# Patient Record
Sex: FEMALE | Race: BLACK OR AFRICAN AMERICAN | NOT HISPANIC OR LATINO | Employment: STUDENT | URBAN - METROPOLITAN AREA
[De-identification: names, ages, dates, MRNs, and addresses within clinical notes are randomized per-mention and may not be internally consistent; named-entity substitution may affect disease eponyms.]

---
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
## 2021-03-31 DIAGNOSIS — Z23 ENCOUNTER FOR IMMUNIZATION: ICD-10-CM

## 2023-11-22 ENCOUNTER — HOSPITAL ENCOUNTER (EMERGENCY)
Facility: HOSPITAL | Age: 23
Discharge: HOME | End: 2023-11-23
Attending: EMERGENCY MEDICINE
Payer: COMMERCIAL

## 2023-11-22 DIAGNOSIS — R51.9 HEADACHE: Primary | ICD-10-CM

## 2023-11-22 DIAGNOSIS — E87.6 HYPOKALEMIA: ICD-10-CM

## 2023-11-22 PROBLEM — Z98.890 S/P RHINOPLASTY: Status: ACTIVE | Noted: 2023-11-22

## 2023-11-22 LAB
ALBUMIN SERPL BCP-MCNC: 4.1 G/DL (ref 3.5–5)
ALP SERPL-CCNC: 60 U/L (ref 34–104)
ALT SERPL W P-5'-P-CCNC: 12 U/L (ref 7–52)
ANION GAP SERPL CALCULATED.3IONS-SCNC: 7 MMOL/L
AST SERPL W P-5'-P-CCNC: 17 U/L (ref 13–39)
BASOPHILS # BLD AUTO: 0.02 THOUSANDS/ÂΜL (ref 0–0.1)
BASOPHILS NFR BLD AUTO: 0 % (ref 0–1)
BILIRUB SERPL-MCNC: 0.17 MG/DL (ref 0.2–1)
BUN SERPL-MCNC: 10 MG/DL (ref 5–25)
CALCIUM SERPL-MCNC: 8.4 MG/DL (ref 8.4–10.2)
CHLORIDE SERPL-SCNC: 107 MMOL/L (ref 96–108)
CO2 SERPL-SCNC: 23 MMOL/L (ref 21–32)
CREAT SERPL-MCNC: 0.71 MG/DL (ref 0.6–1.3)
EOSINOPHIL # BLD AUTO: 0.22 THOUSAND/ÂΜL (ref 0–0.61)
EOSINOPHIL NFR BLD AUTO: 3 % (ref 0–6)
ERYTHROCYTE [DISTWIDTH] IN BLOOD BY AUTOMATED COUNT: 16 % (ref 11.6–15.1)
EXT PREGNANCY TEST URINE: NEGATIVE
EXT. CONTROL: NORMAL
FLUAV RNA RESP QL NAA+PROBE: NEGATIVE
FLUBV RNA RESP QL NAA+PROBE: NEGATIVE
GFR SERPL CREATININE-BSD FRML MDRD: 121 ML/MIN/1.73SQ M
GLUCOSE SERPL-MCNC: 81 MG/DL (ref 65–140)
HCT VFR BLD AUTO: 39.2 % (ref 34.8–46.1)
HGB BLD-MCNC: 12 G/DL (ref 11.5–15.4)
IMM GRANULOCYTES # BLD AUTO: 0.02 THOUSAND/UL (ref 0–0.2)
IMM GRANULOCYTES NFR BLD AUTO: 0 % (ref 0–2)
LIPASE SERPL-CCNC: 32 U/L (ref 11–82)
LYMPHOCYTES # BLD AUTO: 1.26 THOUSANDS/ÂΜL (ref 0.6–4.47)
LYMPHOCYTES NFR BLD AUTO: 18 % (ref 14–44)
MCH RBC QN AUTO: 22.4 PG (ref 26.8–34.3)
MCHC RBC AUTO-ENTMCNC: 30.6 G/DL (ref 31.4–37.4)
MCV RBC AUTO: 73 FL (ref 82–98)
MONOCYTES # BLD AUTO: 0.93 THOUSAND/ÂΜL (ref 0.17–1.22)
MONOCYTES NFR BLD AUTO: 13 % (ref 4–12)
NEUTROPHILS # BLD AUTO: 4.48 THOUSANDS/ÂΜL (ref 1.85–7.62)
NEUTS SEG NFR BLD AUTO: 66 % (ref 43–75)
NRBC BLD AUTO-RTO: 0 /100 WBCS
PLATELET # BLD AUTO: 253 THOUSANDS/UL (ref 149–390)
PMV BLD AUTO: 10.7 FL (ref 8.9–12.7)
POTASSIUM SERPL-SCNC: 3.3 MMOL/L (ref 3.5–5.3)
PROT SERPL-MCNC: 6.8 G/DL (ref 6.4–8.4)
RBC # BLD AUTO: 5.36 MILLION/UL (ref 3.81–5.12)
RSV RNA RESP QL NAA+PROBE: NEGATIVE
SARS-COV-2 RNA RESP QL NAA+PROBE: NEGATIVE
SODIUM SERPL-SCNC: 137 MMOL/L (ref 135–147)
WBC # BLD AUTO: 6.93 THOUSAND/UL (ref 4.31–10.16)

## 2023-11-22 PROCEDURE — 36415 COLL VENOUS BLD VENIPUNCTURE: CPT

## 2023-11-22 PROCEDURE — 96374 THER/PROPH/DIAG INJ IV PUSH: CPT

## 2023-11-22 PROCEDURE — 93005 ELECTROCARDIOGRAM TRACING: CPT

## 2023-11-22 PROCEDURE — 99285 EMERGENCY DEPT VISIT HI MDM: CPT | Performed by: EMERGENCY MEDICINE

## 2023-11-22 PROCEDURE — 0241U HB NFCT DS VIR RESP RNA 4 TRGT: CPT

## 2023-11-22 PROCEDURE — 80053 COMPREHEN METABOLIC PANEL: CPT

## 2023-11-22 PROCEDURE — 96375 TX/PRO/DX INJ NEW DRUG ADDON: CPT

## 2023-11-22 PROCEDURE — 83690 ASSAY OF LIPASE: CPT

## 2023-11-22 PROCEDURE — 85025 COMPLETE CBC W/AUTO DIFF WBC: CPT

## 2023-11-22 PROCEDURE — 81025 URINE PREGNANCY TEST: CPT

## 2023-11-22 PROCEDURE — 99284 EMERGENCY DEPT VISIT MOD MDM: CPT

## 2023-11-22 PROCEDURE — 96361 HYDRATE IV INFUSION ADD-ON: CPT

## 2023-11-22 RX ORDER — ONDANSETRON 2 MG/ML
4 INJECTION INTRAMUSCULAR; INTRAVENOUS ONCE
Status: COMPLETED | OUTPATIENT
Start: 2023-11-22 | End: 2023-11-22

## 2023-11-22 RX ORDER — KETOROLAC TROMETHAMINE 30 MG/ML
15 INJECTION, SOLUTION INTRAMUSCULAR; INTRAVENOUS ONCE
Status: COMPLETED | OUTPATIENT
Start: 2023-11-22 | End: 2023-11-22

## 2023-11-22 RX ORDER — ACETAMINOPHEN 325 MG/1
975 TABLET ORAL ONCE
Status: COMPLETED | OUTPATIENT
Start: 2023-11-22 | End: 2023-11-22

## 2023-11-22 RX ADMIN — SODIUM CHLORIDE 1000 ML: 0.9 INJECTION, SOLUTION INTRAVENOUS at 22:50

## 2023-11-22 RX ADMIN — ONDANSETRON 4 MG: 2 INJECTION INTRAMUSCULAR; INTRAVENOUS at 22:47

## 2023-11-22 RX ADMIN — ACETAMINOPHEN 975 MG: 325 TABLET, FILM COATED ORAL at 22:46

## 2023-11-22 RX ADMIN — KETOROLAC TROMETHAMINE 15 MG: 30 INJECTION, SOLUTION INTRAMUSCULAR at 22:47

## 2023-11-23 ENCOUNTER — APPOINTMENT (EMERGENCY)
Dept: CT IMAGING | Facility: HOSPITAL | Age: 23
End: 2023-11-23
Payer: COMMERCIAL

## 2023-11-23 VITALS
TEMPERATURE: 98.6 F | DIASTOLIC BLOOD PRESSURE: 56 MMHG | RESPIRATION RATE: 17 BRPM | SYSTOLIC BLOOD PRESSURE: 114 MMHG | HEART RATE: 94 BPM | OXYGEN SATURATION: 100 %

## 2023-11-23 VITALS
OXYGEN SATURATION: 100 % | DIASTOLIC BLOOD PRESSURE: 68 MMHG | SYSTOLIC BLOOD PRESSURE: 115 MMHG | RESPIRATION RATE: 18 BRPM | TEMPERATURE: 98.5 F | HEART RATE: 86 BPM

## 2023-11-23 DIAGNOSIS — R10.84 GENERALIZED ABDOMINAL PAIN: Primary | ICD-10-CM

## 2023-11-23 LAB
ALBUMIN SERPL BCP-MCNC: 3.9 G/DL (ref 3.5–5)
ALP SERPL-CCNC: 63 U/L (ref 34–104)
ALT SERPL W P-5'-P-CCNC: 16 U/L (ref 7–52)
ANION GAP SERPL CALCULATED.3IONS-SCNC: 7 MMOL/L
AST SERPL W P-5'-P-CCNC: 23 U/L (ref 13–39)
ATRIAL RATE: 82 BPM
BASOPHILS # BLD AUTO: 0.02 THOUSANDS/ÂΜL (ref 0–0.1)
BASOPHILS NFR BLD AUTO: 0 % (ref 0–1)
BILIRUB SERPL-MCNC: 0.19 MG/DL (ref 0.2–1)
BILIRUB UR QL STRIP: NEGATIVE
BUN SERPL-MCNC: 8 MG/DL (ref 5–25)
CALCIUM SERPL-MCNC: 8.8 MG/DL (ref 8.4–10.2)
CARDIAC TROPONIN I PNL SERPL HS: <2 NG/L
CHLORIDE SERPL-SCNC: 107 MMOL/L (ref 96–108)
CLARITY UR: CLEAR
CO2 SERPL-SCNC: 22 MMOL/L (ref 21–32)
COLOR UR: COLORLESS
CREAT SERPL-MCNC: 0.74 MG/DL (ref 0.6–1.3)
EOSINOPHIL # BLD AUTO: 0.11 THOUSAND/ÂΜL (ref 0–0.61)
EOSINOPHIL NFR BLD AUTO: 2 % (ref 0–6)
ERYTHROCYTE [DISTWIDTH] IN BLOOD BY AUTOMATED COUNT: 15.9 % (ref 11.6–15.1)
GFR SERPL CREATININE-BSD FRML MDRD: 115 ML/MIN/1.73SQ M
GLUCOSE SERPL-MCNC: 96 MG/DL (ref 65–140)
GLUCOSE UR STRIP-MCNC: NEGATIVE MG/DL
HCT VFR BLD AUTO: 40.2 % (ref 34.8–46.1)
HGB BLD-MCNC: 12.3 G/DL (ref 11.5–15.4)
HGB UR QL STRIP.AUTO: NEGATIVE
IMM GRANULOCYTES # BLD AUTO: 0.01 THOUSAND/UL (ref 0–0.2)
IMM GRANULOCYTES NFR BLD AUTO: 0 % (ref 0–2)
KETONES UR STRIP-MCNC: NEGATIVE MG/DL
LEUKOCYTE ESTERASE UR QL STRIP: NEGATIVE
LIPASE SERPL-CCNC: 26 U/L (ref 11–82)
LYMPHOCYTES # BLD AUTO: 0.68 THOUSANDS/ÂΜL (ref 0.6–4.47)
LYMPHOCYTES NFR BLD AUTO: 11 % (ref 14–44)
MCH RBC QN AUTO: 22.3 PG (ref 26.8–34.3)
MCHC RBC AUTO-ENTMCNC: 30.6 G/DL (ref 31.4–37.4)
MCV RBC AUTO: 73 FL (ref 82–98)
MONOCYTES # BLD AUTO: 0.72 THOUSAND/ÂΜL (ref 0.17–1.22)
MONOCYTES NFR BLD AUTO: 11 % (ref 4–12)
NEUTROPHILS # BLD AUTO: 4.86 THOUSANDS/ÂΜL (ref 1.85–7.62)
NEUTS SEG NFR BLD AUTO: 76 % (ref 43–75)
NITRITE UR QL STRIP: NEGATIVE
NRBC BLD AUTO-RTO: 0 /100 WBCS
P AXIS: 60 DEGREES
PH UR STRIP.AUTO: 5.5 [PH]
PLATELET # BLD AUTO: 220 THOUSANDS/UL (ref 149–390)
PMV BLD AUTO: 10 FL (ref 8.9–12.7)
POTASSIUM SERPL-SCNC: 3.4 MMOL/L (ref 3.5–5.3)
PR INTERVAL: 148 MS
PROT SERPL-MCNC: 6.4 G/DL (ref 6.4–8.4)
PROT UR STRIP-MCNC: NEGATIVE MG/DL
QRS AXIS: 86 DEGREES
QRSD INTERVAL: 76 MS
QT INTERVAL: 382 MS
QTC INTERVAL: 446 MS
RBC # BLD AUTO: 5.52 MILLION/UL (ref 3.81–5.12)
SODIUM SERPL-SCNC: 136 MMOL/L (ref 135–147)
SP GR UR STRIP.AUTO: 1.04 (ref 1–1.03)
T WAVE AXIS: 58 DEGREES
UROBILINOGEN UR STRIP-ACNC: <2 MG/DL
VENTRICULAR RATE: 82 BPM
WBC # BLD AUTO: 6.4 THOUSAND/UL (ref 4.31–10.16)

## 2023-11-23 PROCEDURE — 99284 EMERGENCY DEPT VISIT MOD MDM: CPT

## 2023-11-23 PROCEDURE — 83690 ASSAY OF LIPASE: CPT | Performed by: EMERGENCY MEDICINE

## 2023-11-23 PROCEDURE — 81003 URINALYSIS AUTO W/O SCOPE: CPT

## 2023-11-23 PROCEDURE — 85025 COMPLETE CBC W/AUTO DIFF WBC: CPT | Performed by: EMERGENCY MEDICINE

## 2023-11-23 PROCEDURE — 96374 THER/PROPH/DIAG INJ IV PUSH: CPT

## 2023-11-23 PROCEDURE — 93005 ELECTROCARDIOGRAM TRACING: CPT

## 2023-11-23 PROCEDURE — 99285 EMERGENCY DEPT VISIT HI MDM: CPT | Performed by: EMERGENCY MEDICINE

## 2023-11-23 PROCEDURE — 74177 CT ABD & PELVIS W/CONTRAST: CPT

## 2023-11-23 PROCEDURE — 80053 COMPREHEN METABOLIC PANEL: CPT | Performed by: EMERGENCY MEDICINE

## 2023-11-23 PROCEDURE — 36415 COLL VENOUS BLD VENIPUNCTURE: CPT | Performed by: EMERGENCY MEDICINE

## 2023-11-23 PROCEDURE — G1004 CDSM NDSC: HCPCS

## 2023-11-23 PROCEDURE — 84484 ASSAY OF TROPONIN QUANT: CPT

## 2023-11-23 PROCEDURE — 93010 ELECTROCARDIOGRAM REPORT: CPT | Performed by: INTERNAL MEDICINE

## 2023-11-23 RX ORDER — KETOROLAC TROMETHAMINE 30 MG/ML
15 INJECTION, SOLUTION INTRAMUSCULAR; INTRAVENOUS ONCE
Status: COMPLETED | OUTPATIENT
Start: 2023-11-23 | End: 2023-11-23

## 2023-11-23 RX ORDER — POTASSIUM CHLORIDE 20 MEQ/1
20 TABLET, EXTENDED RELEASE ORAL ONCE
Status: COMPLETED | OUTPATIENT
Start: 2023-11-23 | End: 2023-11-23

## 2023-11-23 RX ADMIN — POTASSIUM CHLORIDE 20 MEQ: 1500 TABLET, EXTENDED RELEASE ORAL at 00:28

## 2023-11-23 RX ADMIN — KETOROLAC TROMETHAMINE 15 MG: 30 INJECTION, SOLUTION INTRAMUSCULAR; INTRAVENOUS at 18:06

## 2023-11-23 RX ADMIN — IOHEXOL 100 ML: 350 INJECTION, SOLUTION INTRAVENOUS at 18:47

## 2023-11-23 NOTE — ED ATTENDING ATTESTATION
11/23/2023  IRay MD, saw and evaluated the patient. I have discussed the patient with the resident/non-physician practitioner and agree with the resident's/non-physician practitioner's findings, Plan of Care, and MDM as documented in the resident's/non-physician practitioner's note, except where noted. All available labs and Radiology studies were reviewed. I was present for key portions of any procedure(s) performed by the resident/non-physician practitioner and I was immediately available to provide assistance. At this point I agree with the current assessment done in the Emergency Department. I have conducted an independent evaluation of this patient a history and physical is as follows:    Cahn Fernandez y/o female presenting for re-evaluation of multiple symptoms that have been present for 2 days. The patient was evaluated in the ED yesterday for the same symptoms. Labs and upreg were unremarkable. She states that all of her symptoms are improved after taking cold and flu medication but that she decided to come in for re-evaluation because "I want to know what's going on."    Patient reports:    - body aches: diffuse. No rashes. No joint pain or swelling  - Nausea: no vomiting. Tolerating oral intake  - abdominal pain: diffuse "bubbly" pain that migrates around the abdomen. No CVA pain. No vaginal discharge, abnormal bleeding, or vaginal pain. No diarrhea, blood in her stool, or black tarry stools. History of prior appendectomy. - chest pain: sharp, stabbing to the right anterior chest. Momentary and non-radiating. No shortness of breath or cough  - headache: gradual in onset without vision changes. Resolved after taking cold and flu medications. - dizziness: lightheadedness with standing  - urinary frequency: no dysuria or hematuria. Physical Exam  Constitutional:       General: She is not in acute distress. Appearance: She is well-developed. She is not diaphoretic.    HENT:      Head: Normocephalic and atraumatic. Right Ear: External ear normal.      Left Ear: External ear normal.      Nose: Nose normal.   Eyes:      Conjunctiva/sclera: Conjunctivae normal.   Cardiovascular:      Rate and Rhythm: Normal rate and regular rhythm. Pulses: Normal pulses. Heart sounds: Normal heart sounds. No murmur heard. No friction rub. No gallop. Pulmonary:      Effort: Pulmonary effort is normal. No respiratory distress. Breath sounds: Normal breath sounds. No wheezing or rales. Abdominal:      General: Bowel sounds are normal. There is no distension. Palpations: Abdomen is soft. Tenderness: There is no abdominal tenderness (benign to deep palpation). There is no right CVA tenderness, left CVA tenderness or guarding. Musculoskeletal:         General: No deformity. Normal range of motion. Cervical back: Normal range of motion and neck supple. Comments: No calf swelling or tenderness   Skin:     General: Skin is warm and dry. Neurological:      Mental Status: She is alert and oriented to person, place, and time. Motor: No abnormal muscle tone. Psychiatric:         Mood and Affect: Mood normal.             ED Course  ED Course as of 11/23/23 1831   Thu Nov 23, 2023   6012 I personally interpreted the pt's EKG which reveals normal rate, NSR, normal axis, normal intervals, T wave flattening in leads V2-V3 without ST segment deviation, pathologic T wave inversions or pathologic Q waves. 35 20 43 Patient is well-appearing, non-toxic, afebrile and hemodynamically stable. Abdomen is completely benign to deep palpation. My concern for acute intra-abdominal surgical pathology is very low but the patient expresses concern that when she had appendicitis she was told that nothing was wrong but ended up needing surgery. She is requesting a CT scan of the abdomen pelvis.  Risks vs. Benefits of CT scan of the abdomen pelvis with low pretest probability discussed with the patient. Based on her concern as part of shared decision making a CT scan of the abdomen pelvis was ordered for further evaluation. 26 Pt denies any CP currently. Reports 2 episodes of momentary sharp chest pain. She is low risk by well criteria and PERC negative- doubt PE. Sating 100% on RA, normal breath sounds heard throughout all lung fields, not consistent with pneumothorax. History and presentation not consistent with aortic dissection or CAD.    1822 Headache was gradual in onset, has completely resolved. No red flag symptoms to suggest acute intracranial process. 1823 COVID/flu/RSV testing yesterday was negative. Saar Guardian yesterday negative. 1825 No leukocytosis. Hgb normal.    1828 Care of pt transferred to Dr. Nieves Guevara while awaiting results of labs and CT scan of the abdomen pelvis.           Critical Care Time  Procedures

## 2023-11-23 NOTE — ED CARE HANDOFF
Emergency Department Sign Out Note        Sign out and transfer of care from Dr. Manan Clinton. See Separate Emergency Department note. The patient, Shae Hugo, was evaluated by the previous provider for generalized abdominal pain. Workup Completed:  Lipase was within normal limits, CMP indicated mild hypokalemia but otherwise grossly normal, CBC was also grossly unremarkable. ED Course / Workup Pending (followup):  CT of the abdomen pelvis is pending at this time. If unremarkable the patient will be discharged for outpatient follow-up with her PCP.                 PERC Rule for PE      Flowsheet Row Most Recent Value   PERC Rule for PE    Age >=50 0 Filed at: 11/23/2023 1831   HR >=100 0 Filed at: 11/23/2023 1831   O2 Sat on room air < 95% 0 Filed at: 11/23/2023 1831   History of PE or DVT 0 Filed at: 11/23/2023 1831   Recent trauma or surgery 0 Filed at: 11/23/2023 1831   Hemoptysis 0 Filed at: 11/23/2023 1831   Exogenous estrogen 0 Filed at: 11/23/2023 1831   Unilateral leg swelling 0 Filed at: 11/23/2023 1831   PERC Rule for PE Results 0 Filed at: 11/23/2023 1831                Wells' Criteria for PE      Flowsheet Row Most Recent Value   Wells' Criteria for PE    Clinical signs and symptoms of DVT 0 Filed at: 11/23/2023 1831   PE is primary diagnosis or equally likely 0 Filed at: 11/23/2023 1831   HR >100 0 Filed at: 11/23/2023 1831   Immobilization at least 3 days or Surgery in the previous 4 weeks 0 Filed at: 11/23/2023 1831   Previous, objectively diagnosed PE or DVT 0 Filed at: 11/23/2023 1831   Hemoptysis 0 Filed at: 11/23/2023 1831   Malignancy with treatment within 6 months or palliative 0 Filed at: 11/23/2023 1831   Wells' Criteria Total 0 Filed at: 11/23/2023 1831                  ED Course as of 11/23/23 2026   Thu Nov 23, 2023 2016 CT abdomen pelvis with contrast  IMPRESSION:     Multiple fluid-filled loops of proximal small bowel in the left upper abdomen with mild mural thickening, likely reflecting a mild enteritis. Otherwise no acute inflammatory process in the abdomen or pelvis. 2022 CT imaging indicates enteritis. The patient will be discharged for outpatient follow-up with her PCP. Return precautions will be discussed. Further instructions per discharge orders. Procedures  Medical Decision Making  The patient is a 20-year-old female with a past medical history of appendicitis status post appendectomy who presents to the emergency department with a complaint of generalized abdominal pain as well as myalgias, headache and fever. The patient reports her fever was 101 °F at home and took an aspirin that led to resolution. The patient's lab workup was relatively unremarkable but the patient is very concerned that there is something going on and demanded a CT scan for which she was granted. CT imaging is pending at this time. Results will determine disposition. Patient has no complaints currently. Amount and/or Complexity of Data Reviewed  Labs: ordered. Radiology: ordered. Decision-making details documented in ED Course. Risk  Prescription drug management. Disposition  Final diagnoses:   Generalized abdominal pain     Time reflects when diagnosis was documented in both MDM as applicable and the Disposition within this note       Time User Action Codes Description Comment    11/23/2023  6:33 PM Ami Rising Add [R10.9] Abdominal pain     11/23/2023  8:20 PM Inocencio Ellison Add [R10.84] Generalized abdominal pain     11/23/2023  8:21 PM Inocencio Ellison Modify [R10.84] Generalized abdominal pain     11/23/2023  8:21 PM Inocencio Ellison Remove [R10.9] Abdominal pain           ED Disposition       ED Disposition   Discharge    Condition   Stable    Date/Time   Thu Nov 23, 2023 2022    Comment   Shae Attohokine discharge to home/self care.                    Follow-up Information       Follow up With Specialties Details Why Contact Info Additional [id number] Jania Shaffer MD Pediatrics Schedule an appointment as soon as possible for a visit  For continued symptoms [address] Emergency Department Emergency Medicine  As needed [address] [facility], [place], [place], [address]          Patient's Medications    No medications on file     No discharge procedures on file.        ED Provider  Electronically Signed by     Arianna Garcia DO  11/23/23 2026

## 2023-11-23 NOTE — ED PROVIDER NOTES
History  Chief Complaint   Patient presents with    Abdominal Pain     X couple days, increased today, Here yesterday for the same, nausea     62-year-old female with no significant past medical history, past surgical history remarkable for appendectomy, presents today for evaluation of sharp generalized abdominal pain. Patient states pain started a few days ago and has been occurring intermittently throughout the day. She reports sometimes she will have the same sharp pains occurring in her chest as well. Reports nausea and more frequent stools but denies any vomiting or diarrhea. Patient notes a fever today up to 101 at home, for which she took an aspirin. Reports associated headaches, myalgias and fatigue. She also reports associated urinary frequency but denies any dysuria or hematuria. Denies any shortness of breath. Denies any abnormal vaginal discharge or vaginal bleeding. LMP occurred about 3 weeks ago. Denies any known sick contacts. Denies any other concerns at this time. Abdominal Pain  Associated symptoms: chest pain, fatigue, fever and nausea    Associated symptoms: no chills, no cough, no diarrhea, no dysuria, no hematuria, no shortness of breath, no sore throat and no vomiting        None       Past Medical History:   Diagnosis Date    S/P rhinoplasty        Past Surgical History:   Procedure Laterality Date    APPENDECTOMY         Family History   Problem Relation Age of Onset    Diabetes Mother     Diabetes Father      I have reviewed and agree with the history as documented. E-Cigarette/Vaping     E-Cigarette/Vaping Substances     Social History     Tobacco Use    Smoking status: Never    Smokeless tobacco: Never   Substance Use Topics    Alcohol use: No    Drug use: No        Review of Systems   Constitutional:  Positive for appetite change (decreased), fatigue and fever. Negative for chills. HENT:  Negative for ear pain and sore throat.     Eyes:  Negative for pain and visual disturbance. Respiratory:  Negative for cough and shortness of breath. Cardiovascular:  Positive for chest pain. Negative for palpitations. Gastrointestinal:  Positive for abdominal pain and nausea. Negative for diarrhea and vomiting. Genitourinary:  Positive for frequency. Negative for dysuria and hematuria. Musculoskeletal:  Positive for myalgias. Negative for arthralgias and back pain. Skin:  Negative for color change and rash. Neurological:  Positive for headaches. Negative for seizures and syncope. All other systems reviewed and are negative. Physical Exam  ED Triage Vitals   Temperature Pulse Respirations Blood Pressure SpO2   11/23/23 1747 11/23/23 1747 11/23/23 1747 11/23/23 1747 11/23/23 1747   98.5 °F (36.9 °C) 86 18 115/68 100 %      Temp src Heart Rate Source Patient Position - Orthostatic VS BP Location FiO2 (%)   -- -- 11/23/23 1747 11/23/23 1747 --     Sitting Right arm       Pain Score       11/23/23 1806       No Pain             Orthostatic Vital Signs  Vitals:    11/23/23 1747   BP: 115/68   Pulse: 86   Patient Position - Orthostatic VS: Sitting       Physical Exam  Vitals and nursing note reviewed. Constitutional:       General: She is not in acute distress. Appearance: She is well-developed. She is not ill-appearing. HENT:      Head: Normocephalic and atraumatic. Mouth/Throat:      Mouth: Mucous membranes are moist.   Eyes:      Conjunctiva/sclera: Conjunctivae normal.   Cardiovascular:      Rate and Rhythm: Normal rate and regular rhythm. Heart sounds: No murmur heard. Pulmonary:      Effort: Pulmonary effort is normal. No respiratory distress. Breath sounds: Normal breath sounds. Abdominal:      General: Abdomen is flat. There is no distension. Palpations: Abdomen is soft. Tenderness: There is no abdominal tenderness. There is no right CVA tenderness, left CVA tenderness, guarding or rebound.    Musculoskeletal:         General: No swelling. Cervical back: Neck supple. Skin:     General: Skin is warm and dry. Capillary Refill: Capillary refill takes less than 2 seconds. Neurological:      Mental Status: She is alert.          ED Medications  Medications   ketorolac (TORADOL) injection 15 mg (15 mg Intravenous Given 11/23/23 1806)       Diagnostic Studies  Results Reviewed       Procedure Component Value Units Date/Time    Comprehensive metabolic panel [[id number]]  (Abnormal) Collected: 11/23/23 1804    Lab Status: Final result Specimen: Blood from Arm, Left Updated: 11/23/23 1830     Sodium 136 mmol/L      Potassium 3.4 mmol/L      Chloride 107 mmol/L      CO2 22 mmol/L      ANION GAP 7 mmol/L      BUN 8 mg/dL      Creatinine 0.74 mg/dL      Glucose 96 mg/dL      Calcium 8.8 mg/dL      AST 23 U/L      ALT 16 U/L      Alkaline Phosphatase 63 U/L      Total Protein 6.4 g/dL      Albumin 3.9 g/dL      Total Bilirubin 0.19 mg/dL      eGFR 115 ml/min/1.73sq m     Narrative:      [place] guidelines for Chronic Kidney Disease (CKD):     Stage 1 with normal or high GFR (GFR > 90 mL/min/1.73 square meters)    Stage 2 Mild CKD (GFR = 60-89 mL/min/1.73 square meters)    Stage 3A Moderate CKD (GFR = 45-59 mL/min/1.73 square meters)    Stage 3B Moderate CKD (GFR = 30-44 mL/min/1.73 square meters)    Stage 4 Severe CKD (GFR = 15-29 mL/min/1.73 square meters)    Stage 5 End Stage CKD (GFR <15 mL/min/1.73 square meters)  Note: GFR calculation is accurate only with a steady state creatinine    Lipase [[id number]]  (Normal) Collected: 11/23/23 1804    Lab Status: Final result Specimen: Blood from Arm, Left Updated: 11/23/23 1830     Lipase 26 u/L     CBC and differential [[id number]]  (Abnormal) Collected: 11/23/23 1804    Lab Status: Final result Specimen: Blood from Arm, Left Updated: 11/23/23 1823     WBC 6.40 Thousand/uL      RBC 5.52 Million/uL      Hemoglobin 12.3 g/dL      Hematocrit 40.2 %      MCV 73 fL      MCH 22.3 pg      MCHC 30.6 g/dL      RDW 15.9 %      MPV 10.0 fL      Platelets 586 Thousands/uL      nRBC 0 /100 WBCs      Neutrophils Relative 76 %      Immat GRANS % 0 %      Lymphocytes Relative 11 %      Monocytes Relative 11 %      Eosinophils Relative 2 %      Basophils Relative 0 %      Neutrophils Absolute 4.86 Thousands/µL      Immature Grans Absolute 0.01 Thousand/uL      Lymphocytes Absolute 0.68 Thousands/µL      Monocytes Absolute 0.72 Thousand/µL      Eosinophils Absolute 0.11 Thousand/µL      Basophils Absolute 0.02 Thousands/µL     HS Troponin 0hr (reflex protocol) [[id number]] Collected: 11/23/23 1804    Lab Status:  In process Specimen: Blood from Arm, Left Updated: 11/23/23 1807    UA w Reflex to Microscopic w Reflex to Culture [[id number]]     Lab Status: No result Specimen: Urine                    CT abdomen pelvis with contrast    (Results Pending)         Procedures  ECG 12 Lead Documentation Only    Date/Time: 11/23/2023 6:31 PM    Performed by: Brain Martínez MD  Authorized by: Brain Martínez MD    Indications / Diagnosis:  Chest pain  ECG reviewed by me, the ED Provider: yes    Patient location:  ED  Previous ECG:     Previous ECG:  Compared to current    Similarity:  No change  Interpretation:     Interpretation: normal    Rate:     ECG rate:  85    ECG rate assessment: normal    Rhythm:     Rhythm: sinus rhythm    Ectopy:     Ectopy: none    QRS:     QRS axis:  Normal    QRS intervals:  Normal  Conduction:     Conduction: normal    ST segments:     ST segments:  Normal  T waves:     T waves: normal    Comments:      Normal sinus rhythm, normal axis, normal intervals, no ST T wave abnormalities         ED Course                     PERC Rule for PE      Flowsheet Row Most Recent Value   PERC Rule for PE    Age >=50 0 Filed at: 11/23/2023 1831   HR >=100 0 Filed at: 11/23/2023 1831   O2 Sat on room air < 95% 0 Filed at: 11/23/2023 1831   History of PE or DVT 0 Filed at: 11/23/2023 1831 Recent trauma or surgery 0 Filed at: 11/23/2023 1831   Hemoptysis 0 Filed at: 11/23/2023 1831   Exogenous estrogen 0 Filed at: 11/23/2023 1831   Unilateral leg swelling 0 Filed at: 11/23/2023 1831   PERC Rule for PE Results 0 Filed at: 11/23/2023 1831                SBIRT 22yo+      Flowsheet Row Most Recent Value   Initial Alcohol Screen: US AUDIT-C     1. How often do you have a drink containing alcohol? 0 Filed at: 11/23/2023 1747   2. How many drinks containing alcohol do you have on a typical day you are drinking? 0 Filed at: 11/23/2023 1747   3a. Male UNDER 65: How often do you have five or more drinks on one occasion? 0 Filed at: 11/23/2023 1747   3b. FEMALE Any Age, or MALE 65+: How often do you have 4 or more drinks on one occassion? 0 Filed at: 11/23/2023 1747   Audit-C Score 0 Filed at: 11/23/2023 1747   VINAYAK: How many times in the past year have you. .. Used an illegal drug or used a prescription medication for non-medical reasons? Never Filed at: 11/23/2023 1747            Wells' Criteria for PE      Flowsheet Row Most Recent Value   Wells' Criteria for PE    Clinical signs and symptoms of DVT 0 Filed at: 11/23/2023 1831   PE is primary diagnosis or equally likely 0 Filed at: 11/23/2023 1831   HR >100 0 Filed at: 11/23/2023 1831   Immobilization at least 3 days or Surgery in the previous 4 weeks 0 Filed at: 11/23/2023 1831   Previous, objectively diagnosed PE or DVT 0 Filed at: 11/23/2023 1831   Hemoptysis 0 Filed at: 11/23/2023 1831   Malignancy with treatment within 6 months or palliative 0 Filed at: 11/23/2023 1831   Wells' Criteria Total 0 Filed at: 11/23/2023 [address]  25-year-old female with no significant past medical history, past surgical history remarkable for appendectomy, presents today for evaluation of sharp generalized abdominal pain with associated nausea, myalgias, headaches, chest pain, and urinary frequency.   Patient is very well-appearing with overall benign abdominal exam.  Discussed with patient that I suspect her symptoms are due to viral illness and there is low suspicion for acute intraabdominal pathology at this time. Discussed risks vs benefits of obtaining further imaging at this time. Patient would like to proceed with further imaging at this time. Patient signed out to Dr. Bonita Soni pending CT scan. Amount and/or Complexity of Data Reviewed  Labs: ordered. Radiology: ordered. Risk  Prescription drug management. Disposition  Final diagnoses:   Abdominal pain     Time reflects when diagnosis was documented in both MDM as applicable and the Disposition within this note       Time User Action Codes Description Comment    11/23/2023  6:33 PM Polina [place] Add [R10.9] Abdominal pain           ED Disposition       None          Follow-up Information    None         Patient's Medications    No medications on file     No discharge procedures on file. PDMP Review       None             ED Provider  Attending physically available and evaluated Shae Hugo. I managed the patient along with the ED Attending.     Electronically Signed by           Polina [place] MD  11/23/23 2646

## 2023-11-23 NOTE — ED PROVIDER NOTES
History  Chief Complaint   Patient presents with    Dizziness     Pt states she has been feeling lightheaded for the last 2 days. +chest pressure/N/V/Abdominal discomfort/ headache. Pt just returned from [facility] last week and states she had a similar feeling when she returned but that passed      HPI 23yoF, feeling lightheaded and dizzy when she stands up, headache for the last 2-3 days. Had some right sided non radiating chest pain yesterday, none today. Softer and more frequent stools than usual, normal color, no blood or melena. No cough. Some congestion and rhinorrhea. +nausea but no vomiting. Denies any urinary sxs. PSH includes appendectomy. No hx of dvt or pe. None       Past Medical History:   Diagnosis Date    S/P rhinoplasty        Past Surgical History:   Procedure Laterality Date    APPENDECTOMY         Family History   Problem Relation Age of Onset    Diabetes Mother     Diabetes Father      I have reviewed and agree with the history as documented. E-Cigarette/Vaping     E-Cigarette/Vaping Substances     Social History     Tobacco Use    Smoking status: Never    Smokeless tobacco: Never   Substance Use Topics    Alcohol use: No    Drug use: No        Review of Systems   Constitutional:  Negative for chills and fever. HENT:  Negative for ear pain and sore throat. Eyes:  Negative for visual disturbance. Respiratory:  Negative for cough and shortness of breath. Cardiovascular:  Negative for chest pain and leg swelling. Gastrointestinal:  Positive for nausea. Negative for abdominal pain, blood in stool, constipation, diarrhea and vomiting. Genitourinary:  Negative for dysuria and hematuria. Musculoskeletal:  Negative for neck pain and neck stiffness. Neurological:  Positive for dizziness, light-headedness and headaches. Negative for syncope and weakness. All other systems reviewed and are negative.       Physical Exam  ED Triage Vitals [11/22/23 2215]   Temp Pulse Respirations Blood Pressure SpO2   -- -- 18 122/68 --      Temp src Heart Rate Source Patient Position - Orthostatic VS BP Location FiO2 (%)   -- -- -- -- --      Pain Score       --             Orthostatic Vital Signs  Vitals:    11/22/23 2215   BP: 122/68       Physical Exam  Vitals and nursing note reviewed. Constitutional:       General: She is not in acute distress. Appearance: Normal appearance. She is well-developed. She is not ill-appearing, toxic-appearing or diaphoretic. HENT:      Head: Normocephalic and atraumatic. Right Ear: Tympanic membrane, ear canal and external ear normal.      Left Ear: Tympanic membrane, ear canal and external ear normal.      Nose: Congestion and rhinorrhea present. Mouth/Throat:      Mouth: Mucous membranes are moist.      Pharynx: Oropharynx is clear. No oropharyngeal exudate or posterior oropharyngeal erythema. Eyes:      Extraocular Movements: Extraocular movements intact. Conjunctiva/sclera: Conjunctivae normal.      Pupils: Pupils are equal, round, and reactive to light. Cardiovascular:      Rate and Rhythm: Normal rate and regular rhythm. Heart sounds: No murmur heard. Pulmonary:      Effort: Pulmonary effort is normal. No respiratory distress. Breath sounds: Normal breath sounds. Abdominal:      General: Abdomen is flat. There is no distension. Palpations: Abdomen is soft. Tenderness: There is abdominal tenderness (epigastric). There is no guarding or rebound. Musculoskeletal:         General: No swelling or tenderness. Cervical back: Normal range of motion and neck supple. Right lower leg: No edema. Left lower leg: No edema. Skin:     General: Skin is warm and dry. Capillary Refill: Capillary refill takes less than 2 seconds. Coloration: Skin is not jaundiced or pale. Findings: No rash. Neurological:      General: No focal deficit present.       Mental Status: She is alert and oriented to person, place, and time. Cranial Nerves: No cranial nerve deficit. Motor: No weakness. Gait: Gait normal.   Psychiatric:         Mood and Affect: Mood normal.         ED Medications  Medications - No data to display    Diagnostic Studies  Results Reviewed       None                   No orders to display         Procedures  ECG 12 Lead Documentation Only    Date/Time: 11/22/2023 10:36 PM    Performed by: Jinny Carlos MD  Authorized by: Jinny Carlos MD    Indications / Diagnosis:  Lightheaded  ECG reviewed by me, the ED Provider: yes    Patient location:  ED  Interpretation:     Interpretation: normal    Rate:     ECG rate:  82    ECG rate assessment: normal    Rhythm:     Rhythm: sinus rhythm    Ectopy:     Ectopy: none    QRS:     QRS axis:  Normal    QRS intervals:  Normal  Conduction:     Conduction: normal    ST segments:     ST segments:  Normal  T waves:     T waves: normal    Comments:              ED Course                                       Medical Decision Making  Amount and/or Complexity of Data Reviewed  Labs: ordered. Risk  OTC drugs. Prescription drug management. Disposition  Final diagnoses:   None     ED Disposition       None          Follow-up Information    None         Patient's Medications    No medications on file     No discharge procedures on file. PDMP Review       None             ED Provider  Attending physically available and evaluated Shae Attnayla. I managed the patient along with the ED Attending.     Electronically Signed by RNA.    Positive results are indicative of infection with SARS-CoV-2, the virus  causing COVID-19, but do not rule out bacterial infection or co-infection  with other viruses. Laboratories within the [facility] and its  territories are required to report all positive results to the appropriate  public health authorities. Negative results do not preclude SARS-CoV-2  infection and should not be used as the sole basis for treatment or other  patient management decisions. Negative results must be combined with  clinical observations, patient history, and epidemiological information. This test has not been FDA cleared or approved. This test has been authorized by FDA under an Emergency Use Authorization  (EUA). This test is only authorized for the duration of time the  declaration that circumstances exist justifying the authorization of the  emergency use of an in vitro diagnostic tests for detection of SARS-CoV-2  virus and/or diagnosis of COVID-19 infection under section 564(b)(1) of  the Act, 21 U. S.C. 984NWX-8(W)(2), unless the authorization is terminated  or revoked sooner. The test has been validated but independent review by FDA  and CLIA is pending. Test performed using [employer] GeneXpert: This RT-PCR assay targets N2,  a region unique to SARS-CoV-2. A conserved region in the E-gene was chosen  for pan-Sarbecovirus detection which includes SARS-CoV-2. According to CMS-2020-01-R, this platform meets the definition of high-throughput technology.     Comprehensive metabolic panel [[id number]]  (Abnormal) Collected: 11/22/23 5244    Lab Status: Final result Specimen: Blood from Arm, Right Updated: 11/22/23 9258     Sodium 137 mmol/L      Potassium 3.3 mmol/L      Chloride 107 mmol/L      CO2 23 mmol/L      ANION GAP 7 mmol/L      BUN 10 mg/dL      Creatinine 0.71 mg/dL      Glucose 81 mg/dL      Calcium 8.4 mg/dL      AST 17 U/L      ALT 12 U/L      Alkaline Phosphatase 60 U/L      Total Protein 6.8 g/dL Albumin 4.1 g/dL      Total Bilirubin 0.17 mg/dL      eGFR 121 ml/min/1.73sq m     Narrative:      [facility] guidelines for Chronic Kidney Disease (CKD):     Stage 1 with normal or high GFR (GFR > 90 mL/min/1.73 square meters)    Stage 2 Mild CKD (GFR = 60-89 mL/min/1.73 square meters)    Stage 3A Moderate CKD (GFR = 45-59 mL/min/1.73 square meters)    Stage 3B Moderate CKD (GFR = 30-44 mL/min/1.73 square meters)    Stage 4 Severe CKD (GFR = 15-29 mL/min/1.73 square meters)    Stage 5 End Stage CKD (GFR <15 mL/min/1.73 square meters)  Note: GFR calculation is accurate only with a steady state creatinine    Lipase [[id number]]  (Normal) Collected: 11/22/23 2249    Lab Status: Final result Specimen: Blood from Arm, Right Updated: 11/22/23 2316     Lipase 32 u/L     CBC and differential [[id number]]  (Abnormal) Collected: 11/22/23 2249    Lab Status: Final result Specimen: Blood from Arm, Right Updated: 11/22/23 2259     WBC 6.93 Thousand/uL      RBC 5.36 Million/uL      Hemoglobin 12.0 g/dL      Hematocrit 39.2 %      MCV 73 fL      MCH 22.4 pg      MCHC 30.6 g/dL      RDW 16.0 %      MPV 10.7 fL      Platelets 946 Thousands/uL      nRBC 0 /100 WBCs      Neutrophils Relative 66 %      Immat GRANS % 0 %      Lymphocytes Relative 18 %      Monocytes Relative 13 %      Eosinophils Relative 3 %      Basophils Relative 0 %      Neutrophils Absolute 4.48 Thousands/µL      Immature Grans Absolute 0.02 Thousand/uL      Lymphocytes Absolute 1.26 Thousands/µL      Monocytes Absolute 0.93 Thousand/µL      Eosinophils Absolute 0.22 Thousand/µL      Basophils Absolute 0.02 Thousands/µL     POCT pregnancy, urine [[id number]]  (Normal) Resulted: 11/22/23 2248    Lab Status: Final result Updated: 11/22/23 2248     EXT Preg Test, Ur Negative     Control Valid                   No orders to display         Procedures  ECG 12 Lead Documentation Only    Date/Time: 11/22/2023 10:36 PM    Performed by:  Coretta Montes Frank Saldivar MD  Authorized by: Sherry Soni MD    Indications / Diagnosis:  Lightheaded  ECG reviewed by me, the ED Provider: yes    Patient location:  ED  Interpretation:     Interpretation: normal    Rate:     ECG rate:  82    ECG rate assessment: normal    Rhythm:     Rhythm: sinus rhythm    Ectopy:     Ectopy: none    QRS:     QRS axis:  Normal    QRS intervals:  Normal  Conduction:     Conduction: normal    ST segments:     ST segments:  Normal  T waves:     T waves: normal    Comments:      Qtc 446        ED Course  ED Course as of 12/05/23 0615   Wed Nov 22, 2023   2259 PREGNANCY TEST URINE: Negative  negative   2308 WBC: 6.93  No leukocytosis, no bandemia   Thu Nov 23, 2023   0000 SARS-COV-2: Negative   0000 INFLU A PCR: Negative   0000 INFLU B PCR: Negative   0000 RSV PCR: Negative   0000 Lipase: 32  wnl   0000 Potassium(!): 3.3  Will replace with 20 mEq                                       Medical Decision Making  23yoF, feeling lightheaded and dizzy when she stands up, headache for the last 2-3 days. Ddx includes but is not limited to: primary headache, dehydration, viral syndrome    Here in the ED, the patient is hemodynamically stable, afebrile, non tachycardic with normal work of breathing, satting 100% on RA. She is well-appearing, non-toxic. No acute distress. She has good color and tone. Her physical exam is unremarkable apart from some mild epigastric discomfort with palpation, though the abdomen is soft, non distended, without rebound or guarding. Lab workup here notable for mild hypokalemia to 3.3, which is replace orally. Flu/COVID/RSV negative. Normal lipase. No leukocytosis or bandemia. Interventions here in the ED include IVF rehydration, toradol, tylenol, and zofran. On re-examination, patient feels better and does feel comfortable returning home. Discussion of strict return precautions. Patient discharged to home in stable condition.      Amount and/or Complexity of Data Reviewed  Labs: ordered. Decision-making details documented in ED Course. Risk  OTC drugs. Prescription drug management. Disposition  Final diagnoses:   Headache   Hypokalemia     Time reflects when diagnosis was documented in both MDM as applicable and the Disposition within this note       Time User Action Codes Description Comment    11/23/2023 12:48 AM Yfn Son Add [R51.9] Headache     11/23/2023 12:48 AM Yfn Son Add [E87.6] Hypokalemia           ED Disposition       ED Disposition   Discharge    Condition   Stable    Date/Time   u Nov 23, 2023 12:48 AM    Comment   Shae Hugo discharge to home/self care. Follow-up Information    None         There are no discharge medications for this patient. No discharge procedures on file. PDMP Review       None             ED Provider  Attending physically available and evaluated Shae Attnayla. I managed the patient along with the ED Attending.     Electronically Signed by           Yfn Son MD  12/05/23 7621

## 2023-11-23 NOTE — ED NOTES
Pt unable to provide urine sample at this time- provider aware     José Antonio Sawyer RN  11/23/23 5408

## 2023-11-24 LAB
ATRIAL RATE: 85 BPM
P AXIS: 36 DEGREES
PR INTERVAL: 138 MS
QRS AXIS: 74 DEGREES
QRSD INTERVAL: 82 MS
QT INTERVAL: 364 MS
QTC INTERVAL: 433 MS
T WAVE AXIS: 32 DEGREES
VENTRICULAR RATE: 85 BPM

## 2023-11-24 PROCEDURE — 93010 ELECTROCARDIOGRAM REPORT: CPT | Performed by: INTERNAL MEDICINE

## 2023-11-24 NOTE — DISCHARGE INSTRUCTIONS
Workup was unremarkable and CT imaging indicated mild viral enteritis. You have been instructed to drink plenty of fluids to stay well-hydrated. You may also take Tylenol or Motrin alternating every 4 hours as needed for pain or fever. Please take Motrin with small meals to avoid upset stomach. You have been encouraged to follow-up with your PCP on an outpatient basis for follow-up evaluation. Should you develop worsening pain or fever uncontrolled with medication, inability to tolerate solids or liquids orally, bloody urine, bloody stool, lightheadedness or any other symptoms that you find concerning please return to the emergency department immediately.